# Patient Record
Sex: FEMALE | Race: WHITE | NOT HISPANIC OR LATINO | URBAN - METROPOLITAN AREA
[De-identification: names, ages, dates, MRNs, and addresses within clinical notes are randomized per-mention and may not be internally consistent; named-entity substitution may affect disease eponyms.]

---

## 2020-01-11 ENCOUNTER — INPATIENT (INPATIENT)
Facility: HOSPITAL | Age: 40
LOS: 18 days | Discharge: NURSING, REHAB OR LONG-TERM CARE | End: 2020-01-30
Admitting: INTERNAL MEDICINE
Payer: COMMERCIAL

## 2020-01-11 DIAGNOSIS — F17.210 NICOTINE DEPENDENCE, CIGARETTES, UNCOMPLICATED: ICD-10-CM

## 2020-01-11 DIAGNOSIS — I46.2 CARDIAC ARREST DUE TO UNDERLYING CARDIAC CONDITION: ICD-10-CM

## 2020-01-11 DIAGNOSIS — I69.891 DYSPHAGIA FOLLOWING OTHER CEREBROVASCULAR DISEASE: ICD-10-CM

## 2020-01-11 DIAGNOSIS — I25.10 ATHEROSCLEROTIC HEART DISEASE OF NATIVE CORONARY ARTERY WITHOUT ANGINA PECTORIS: ICD-10-CM

## 2020-01-11 DIAGNOSIS — I44.2 ATRIOVENTRICULAR BLOCK, COMPLETE: ICD-10-CM

## 2020-01-11 DIAGNOSIS — D72.829 ELEVATED WHITE BLOOD CELL COUNT, UNSPECIFIED: ICD-10-CM

## 2020-01-11 DIAGNOSIS — R55 SYNCOPE AND COLLAPSE: ICD-10-CM

## 2020-01-11 DIAGNOSIS — E87.2 ACIDOSIS: ICD-10-CM

## 2020-01-11 DIAGNOSIS — G93.1 ANOXIC BRAIN DAMAGE, NOT ELSEWHERE CLASSIFIED: ICD-10-CM

## 2020-01-11 DIAGNOSIS — I21.19 ST ELEVATION (STEMI) MYOCARDIAL INFARCTION INVOLVING OTHER CORONARY ARTERY OF INFERIOR WALL: ICD-10-CM

## 2020-01-11 DIAGNOSIS — I49.01 VENTRICULAR FIBRILLATION: ICD-10-CM

## 2020-01-11 DIAGNOSIS — I69.814 FRONTAL LOBE AND EXECUTIVE FUNCTION DEFICIT FOLLOWING OTHER CEREBROVASCULAR DISEASE: ICD-10-CM

## 2020-01-11 DIAGNOSIS — R13.12 DYSPHAGIA, OROPHARYNGEAL PHASE: ICD-10-CM

## 2020-01-11 DIAGNOSIS — J96.00 ACUTE RESPIRATORY FAILURE, UNSPECIFIED WHETHER WITH HYPOXIA OR HYPERCAPNIA: ICD-10-CM

## 2020-01-11 DIAGNOSIS — I21.3 ST ELEVATION (STEMI) MYOCARDIAL INFARCTION OF UNSPECIFIED SITE: ICD-10-CM

## 2020-01-11 PROCEDURE — 71045 X-RAY EXAM CHEST 1 VIEW: CPT | Mod: 26

## 2020-01-11 PROCEDURE — 70450 CT HEAD/BRAIN W/O DYE: CPT | Mod: 26

## 2020-01-11 PROCEDURE — 92950 HEART/LUNG RESUSCITATION CPR: CPT

## 2020-01-11 PROCEDURE — 99285 EMERGENCY DEPT VISIT HI MDM: CPT | Mod: 25

## 2020-01-11 PROCEDURE — 93458 L HRT ARTERY/VENTRICLE ANGIO: CPT | Mod: 26,59

## 2020-01-11 PROCEDURE — 71045 X-RAY EXAM CHEST 1 VIEW: CPT | Mod: 26,77

## 2020-01-11 PROCEDURE — 92941 PRQ TRLML REVSC TOT OCCL AMI: CPT | Mod: RC

## 2020-01-11 PROCEDURE — 99255 IP/OBS CONSLTJ NEW/EST HI 80: CPT | Mod: 25

## 2020-01-11 PROCEDURE — 93970 EXTREMITY STUDY: CPT | Mod: 26

## 2020-01-11 PROCEDURE — 92978 ENDOLUMINL IVUS OCT C 1ST: CPT | Mod: 26,RC

## 2020-01-12 PROCEDURE — 71045 X-RAY EXAM CHEST 1 VIEW: CPT | Mod: 26

## 2020-01-12 PROCEDURE — 93306 TTE W/DOPPLER COMPLETE: CPT | Mod: 26

## 2020-01-12 PROCEDURE — 99233 SBSQ HOSP IP/OBS HIGH 50: CPT

## 2020-01-13 PROBLEM — Z00.00 ENCOUNTER FOR PREVENTIVE HEALTH EXAMINATION: Status: ACTIVE | Noted: 2020-01-13

## 2020-01-13 PROCEDURE — 99233 SBSQ HOSP IP/OBS HIGH 50: CPT

## 2020-01-13 PROCEDURE — 71045 X-RAY EXAM CHEST 1 VIEW: CPT | Mod: 26

## 2020-01-14 PROCEDURE — 70450 CT HEAD/BRAIN W/O DYE: CPT | Mod: 26

## 2020-01-14 PROCEDURE — 99233 SBSQ HOSP IP/OBS HIGH 50: CPT

## 2020-01-15 ENCOUNTER — OUTPATIENT (OUTPATIENT)
Dept: OUTPATIENT SERVICES | Facility: HOSPITAL | Age: 40
LOS: 1 days | End: 2020-01-15

## 2020-01-15 PROCEDURE — 71045 X-RAY EXAM CHEST 1 VIEW: CPT | Mod: 26

## 2020-01-16 ENCOUNTER — OUTPATIENT (OUTPATIENT)
Dept: OUTPATIENT SERVICES | Facility: HOSPITAL | Age: 40
LOS: 1 days | End: 2020-01-16

## 2020-01-16 PROCEDURE — 99233 SBSQ HOSP IP/OBS HIGH 50: CPT

## 2020-01-16 PROCEDURE — 71045 X-RAY EXAM CHEST 1 VIEW: CPT | Mod: 26

## 2020-01-17 ENCOUNTER — OUTPATIENT (OUTPATIENT)
Dept: OUTPATIENT SERVICES | Facility: HOSPITAL | Age: 40
LOS: 1 days | End: 2020-01-17

## 2020-01-18 PROCEDURE — 99232 SBSQ HOSP IP/OBS MODERATE 35: CPT

## 2020-01-19 PROCEDURE — 99232 SBSQ HOSP IP/OBS MODERATE 35: CPT

## 2020-01-20 PROCEDURE — 99232 SBSQ HOSP IP/OBS MODERATE 35: CPT

## 2020-01-20 PROCEDURE — 70551 MRI BRAIN STEM W/O DYE: CPT | Mod: 26

## 2020-01-21 PROCEDURE — 71045 X-RAY EXAM CHEST 1 VIEW: CPT | Mod: 26

## 2020-01-23 ENCOUNTER — OUTPATIENT (OUTPATIENT)
Dept: OUTPATIENT SERVICES | Facility: HOSPITAL | Age: 40
LOS: 1 days | End: 2020-01-23

## 2020-01-24 ENCOUNTER — OUTPATIENT (OUTPATIENT)
Dept: OUTPATIENT SERVICES | Facility: HOSPITAL | Age: 40
LOS: 1 days | End: 2020-01-24

## 2020-01-25 ENCOUNTER — OUTPATIENT (OUTPATIENT)
Dept: OUTPATIENT SERVICES | Facility: HOSPITAL | Age: 40
LOS: 1 days | End: 2020-01-25

## 2020-01-26 ENCOUNTER — OUTPATIENT (OUTPATIENT)
Dept: OUTPATIENT SERVICES | Facility: HOSPITAL | Age: 40
LOS: 1 days | End: 2020-01-26

## 2020-01-27 ENCOUNTER — OUTPATIENT (OUTPATIENT)
Dept: OUTPATIENT SERVICES | Facility: HOSPITAL | Age: 40
LOS: 1 days | End: 2020-01-27

## 2020-01-29 ENCOUNTER — OUTPATIENT (OUTPATIENT)
Dept: OUTPATIENT SERVICES | Facility: HOSPITAL | Age: 40
LOS: 1 days | End: 2020-01-29

## 2020-03-17 ENCOUNTER — APPOINTMENT (OUTPATIENT)
Dept: CARDIOLOGY | Facility: CLINIC | Age: 40
End: 2020-03-17
Payer: COMMERCIAL

## 2020-03-17 VITALS
HEIGHT: 65 IN | WEIGHT: 126 LBS | HEART RATE: 63 BPM | OXYGEN SATURATION: 95 % | BODY MASS INDEX: 20.99 KG/M2 | DIASTOLIC BLOOD PRESSURE: 60 MMHG | SYSTOLIC BLOOD PRESSURE: 110 MMHG

## 2020-03-17 DIAGNOSIS — Z78.9 OTHER SPECIFIED HEALTH STATUS: ICD-10-CM

## 2020-03-17 DIAGNOSIS — Z86.74 PERSONAL HISTORY OF SUDDEN CARDIAC ARREST: ICD-10-CM

## 2020-03-17 DIAGNOSIS — I25.2 OLD MYOCARDIAL INFARCTION: ICD-10-CM

## 2020-03-17 DIAGNOSIS — Z87.891 PERSONAL HISTORY OF NICOTINE DEPENDENCE: ICD-10-CM

## 2020-03-17 DIAGNOSIS — Z86.69 PERSONAL HISTORY OF OTHER DISEASES OF THE NERVOUS SYSTEM AND SENSE ORGANS: ICD-10-CM

## 2020-03-17 PROCEDURE — 99214 OFFICE O/P EST MOD 30 MIN: CPT

## 2020-03-17 RX ORDER — ASPIRIN 81 MG
81 TABLET, DELAYED RELEASE (ENTERIC COATED) ORAL DAILY
Refills: 0 | Status: ACTIVE | COMMUNITY

## 2020-03-17 RX ORDER — METOPROLOL TARTRATE 50 MG/1
50 TABLET, FILM COATED ORAL
Refills: 0 | Status: DISCONTINUED | COMMUNITY
End: 2020-03-17

## 2020-03-17 NOTE — DISCUSSION/SUMMARY
[FreeTextEntry1] : 1. Coronary Artery Disease: s/p inferior wall MI (STEMI) on January 11, 2020. Suffered VF arrest. Taken emergently to cath lab and underwent PCI with 1 BRADFORD to the proximal RCA. Normal coronary arteries elsewhere. Normal LV systolic function on follow up echocardiogram. Patient currently on Aspirin 81mg daily, Brilinta 90mg BID, Metoprolol 25mg BID (high risk medication with no signs of toxicity), enalapril 10mg daily and atorvastatin 80mg daily. Discussed importance of dual antiplatelet therapy for 1 year. After 1 year Brilinta can be discontinued and she continues on Aspirin 81mg daily lifelong as long as the benefits continue to the outweigh the risks. Her HRs have been running in the 40-high 50s. Recommend stopping Metoprolol tartrate 25mg BID and start Metoprolol succinate 25mg daily. Once COVID-19 pandemic has improved can consider cardiac rehab in the future. \par \par Gave slip for labs. Goal LDL is going to be less than 70 going forward. Patient continues to not smoke\par \par Follow up in 4 months.

## 2020-03-17 NOTE — REASON FOR VISIT
[Initial Evaluation] : an initial evaluation of [Coronary Artery Disease] : coronary artery disease [Family Member] : family member [FreeTextEntry1] : s/p cardiac arrest secondary to AMI. anoxic brain injury

## 2020-03-17 NOTE — REVIEW OF SYSTEMS
[Feeling Fatigued] : feeling fatigued [Blurry Vision] : blurred vision [see HPI] : see HPI [Negative] : Heme/Lymph [Shortness Of Breath] : no shortness of breath [Dyspnea on exertion] : not dyspnea during exertion [Chest Pain] : no chest pain [Lower Ext Edema] : no extremity edema [Palpitations] : no palpitations [Cough] : no cough [Wheezing] : no wheezing

## 2020-03-17 NOTE — PHYSICAL EXAM
[General Appearance - Well Developed] : well developed [Normal Appearance] : normal appearance [Well Groomed] : well groomed [General Appearance - Well Nourished] : well nourished [General Appearance - In No Acute Distress] : no acute distress [Normal Conjunctiva] : the conjunctiva exhibited no abnormalities [Eyelids - No Xanthelasma] : the eyelids demonstrated no xanthelasmas [Normal Oral Mucosa] : normal oral mucosa [No Oral Pallor] : no oral pallor [No Oral Cyanosis] : no oral cyanosis [Heart Rate And Rhythm] : heart rate and rhythm were normal [Heart Sounds] : normal S1 and S2 [Murmurs] : no murmurs present [Edema] : no peripheral edema present [Respiration, Rhythm And Depth] : normal respiratory rhythm and effort [Exaggerated Use Of Accessory Muscles For Inspiration] : no accessory muscle use [Auscultation Breath Sounds / Voice Sounds] : lungs were clear to auscultation bilaterally [Nail Clubbing] : no clubbing of the fingernails [Cyanosis, Localized] : no localized cyanosis [Skin Color & Pigmentation] : normal skin color and pigmentation [Skin Turgor] : normal skin turgor [] : no rash [FreeTextEntry1] : flat affect, mild cognitive impairment.

## 2020-03-17 NOTE — HISTORY OF PRESENT ILLNESS
[FreeTextEntry1] : 40 year old female, former smoker, [place] [facility] [place]. On arrival to ED patient was found to be in cardiac arrest (VF) with an inferior STEMI. Patient was taken emergently to the cardiac catheterization lab and underwent PCI with 1 BRADFORD to the proximal RCA. At first patient did not regain meaningful neurological recovery and underwent targeted temperature control. Patient was extubated on January 16, 2020. 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 She underwent EGD with PEG placement for her nutritional requirements. For several days patient was non communicative, not following commands. However, over time patient started showing improvement with her speech and cognitive abilities. She was eventually discharged to rehab. She was discharged from rehab on February 27, 2020, and the PEG was removed as well. Patient taking all her medications with complaints. She still has some cognitive impairment and visual disturbances as sequelae of her anoxic brain injury. She denies any chest pain or SOB.

## 2020-08-18 ENCOUNTER — APPOINTMENT (OUTPATIENT)
Dept: CARDIOLOGY | Facility: CLINIC | Age: 40
End: 2020-08-18
Payer: MEDICAID

## 2020-08-18 ENCOUNTER — NON-APPOINTMENT (OUTPATIENT)
Age: 40
End: 2020-08-18

## 2020-08-18 VITALS
OXYGEN SATURATION: 99 % | HEIGHT: 65 IN | HEART RATE: 50 BPM | SYSTOLIC BLOOD PRESSURE: 116 MMHG | WEIGHT: 129 LBS | DIASTOLIC BLOOD PRESSURE: 60 MMHG | BODY MASS INDEX: 21.49 KG/M2 | TEMPERATURE: 98 F

## 2020-08-18 PROCEDURE — 99215 OFFICE O/P EST HI 40 MIN: CPT | Mod: 25

## 2020-08-18 PROCEDURE — 93000 ELECTROCARDIOGRAM COMPLETE: CPT

## 2020-08-18 RX ORDER — DOCUSATE SODIUM 100 MG/1
CAPSULE ORAL
Refills: 0 | Status: ACTIVE | COMMUNITY

## 2020-08-18 RX ORDER — FERROUS SULFATE TAB EC 325 MG (65 MG FE EQUIVALENT) 325 (65 FE) MG
325 (65 FE) TABLET DELAYED RESPONSE ORAL
Refills: 0 | Status: ACTIVE | COMMUNITY

## 2020-08-18 NOTE — PHYSICAL EXAM
[General Appearance - Well Developed] : well developed [Well Groomed] : well groomed [Normal Appearance] : normal appearance [General Appearance - In No Acute Distress] : no acute distress [General Appearance - Well Nourished] : well nourished [Eyelids - No Xanthelasma] : the eyelids demonstrated no xanthelasmas [Normal Conjunctiva] : the conjunctiva exhibited no abnormalities [Normal Oral Mucosa] : normal oral mucosa [No Oral Pallor] : no oral pallor [No Oral Cyanosis] : no oral cyanosis [Respiration, Rhythm And Depth] : normal respiratory rhythm and effort [Exaggerated Use Of Accessory Muscles For Inspiration] : no accessory muscle use [Auscultation Breath Sounds / Voice Sounds] : lungs were clear to auscultation bilaterally [Murmurs] : no murmurs present [Heart Sounds] : normal S1 and S2 [Edema] : no peripheral edema present [Cyanosis, Localized] : no localized cyanosis [Nail Clubbing] : no clubbing of the fingernails [Skin Color & Pigmentation] : normal skin color and pigmentation [Skin Turgor] : normal skin turgor [FreeTextEntry1] : flat affect, mild cognitive impairment.  [] : no rash

## 2020-08-18 NOTE — REASON FOR VISIT
[Coronary Artery Disease] : coronary artery disease [Initial Evaluation] : an initial evaluation of [FreeTextEntry1] : s/p cardiac arrest secondary to AMI. anoxic brain injury  [Family Member] : family member

## 2020-08-18 NOTE — REVIEW OF SYSTEMS
[Feeling Fatigued] : feeling fatigued [Shortness Of Breath] : no shortness of breath [Blurry Vision] : blurred vision [Dyspnea on exertion] : not dyspnea during exertion [Chest Pain] : no chest pain [Cough] : no cough [Lower Ext Edema] : no extremity edema [Palpitations] : no palpitations [Wheezing] : no wheezing [see HPI] : see HPI [Negative] : Heme/Lymph

## 2020-08-18 NOTE — DISCUSSION/SUMMARY
[FreeTextEntry1] : 1. Coronary Artery Disease: s/p inferior wall MI (STEMI) on January 11, 2020. Suffered VF arrest. Taken emergently to cath lab and underwent PCI with 1 BRADFORD to the proximal RCA. Normal coronary arteries elsewhere. Normal LV systolic function on follow up echocardiogram. Patient currently on Aspirin 81mg daily, Brilinta 90mg BID, Metoprolol succinate 25mg daily (high risk medication with no signs of toxicity), enalapril 10mg daily and atorvastatin 80mg daily. Discussed importance of dual antiplatelet therapy for 1 year. After 1 year Brilinta can be discontinued and she continues on Aspirin 81mg daily lifelong as long as the benefits continue to the outweigh the risks. Goal LDL is going to be less than 70 going forward. Patient continues to not smoke.\par \par Patient offered a referral for cardiac rehab but states she is already doing physical therapy at [facility]. \par \par Follow up in 6 months.

## 2020-08-18 NOTE — HISTORY OF PRESENT ILLNESS
[FreeTextEntry1] : Historical Perspective:\par 40 year old female, former smoker, [place] [facility] [place]. On arrival to ED patient was found to be in cardiac arrest (VF) with an inferior STEMI. Patient was taken emergently to the cardiac catheterization lab and underwent PCI with 1 BRADFORD to the proximal RCA. At first patient did not regain meaningful neurological recovery and underwent targeted temperature control. Patient was extubated on January 16, 2020. 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 She underwent EGD with PEG placement for her nutritional requirements. For several days patient was non communicative, not following commands. However, over time patient started showing improvement with her speech and cognitive abilities. She was eventually discharged to rehab. She was discharged from rehab on February 27, 2020, and the PEG was removed as well. Patient taking all her medications with complaints. She still has some cognitive impairment and visual disturbances as sequelae of her anoxic brain injury. She denies any chest pain or SOB. \par \par Current Ariel Status:\par Patient with no chest pain, SOB, or palpitations. No hospitalizations since seeing me last. Remains compliant with his medications and reports no adverse effects.\par

## 2020-12-04 ENCOUNTER — NON-APPOINTMENT (OUTPATIENT)
Age: 40
End: 2020-12-04

## 2021-02-03 ENCOUNTER — APPOINTMENT (OUTPATIENT)
Dept: DISASTER EMERGENCY | Facility: CLINIC | Age: 41
End: 2021-02-03

## 2021-02-04 LAB — SARS-COV-2 N GENE NPH QL NAA+PROBE: NOT DETECTED

## 2021-02-06 ENCOUNTER — OUTPATIENT (OUTPATIENT)
Dept: OUTPATIENT SERVICES | Facility: HOSPITAL | Age: 41
LOS: 1 days | End: 2021-02-06

## 2021-02-12 ENCOUNTER — RX CHANGE (OUTPATIENT)
Age: 41
End: 2021-02-12

## 2021-02-26 ENCOUNTER — APPOINTMENT (OUTPATIENT)
Dept: CARDIOLOGY | Facility: CLINIC | Age: 41
End: 2021-02-26
Payer: MEDICAID

## 2021-02-26 ENCOUNTER — NON-APPOINTMENT (OUTPATIENT)
Age: 41
End: 2021-02-26

## 2021-02-26 VITALS
SYSTOLIC BLOOD PRESSURE: 116 MMHG | OXYGEN SATURATION: 95 % | HEIGHT: 66 IN | WEIGHT: 123 LBS | TEMPERATURE: 97.6 F | DIASTOLIC BLOOD PRESSURE: 60 MMHG | BODY MASS INDEX: 19.77 KG/M2 | HEART RATE: 51 BPM

## 2021-02-26 DIAGNOSIS — Z01.818 ENCOUNTER FOR OTHER PREPROCEDURAL EXAMINATION: ICD-10-CM

## 2021-02-26 PROCEDURE — 93000 ELECTROCARDIOGRAM COMPLETE: CPT

## 2021-02-26 PROCEDURE — 99072 ADDL SUPL MATRL&STAF TM PHE: CPT

## 2021-02-26 PROCEDURE — 99215 OFFICE O/P EST HI 40 MIN: CPT | Mod: 25

## 2021-02-26 RX ORDER — TICAGRELOR 90 MG/1
90 TABLET ORAL TWICE DAILY
Qty: 180 | Refills: 3 | Status: DISCONTINUED | COMMUNITY
Start: 1900-01-01 | End: 2021-02-26

## 2021-02-26 NOTE — PHYSICAL EXAM
[General Appearance - Well Developed] : well developed [Normal Appearance] : normal appearance [Well Groomed] : well groomed [General Appearance - Well Nourished] : well nourished [General Appearance - In No Acute Distress] : no acute distress [Normal Conjunctiva] : the conjunctiva exhibited no abnormalities [Eyelids - No Xanthelasma] : the eyelids demonstrated no xanthelasmas [Normal Oral Mucosa] : normal oral mucosa [No Oral Pallor] : no oral pallor [No Oral Cyanosis] : no oral cyanosis [Respiration, Rhythm And Depth] : normal respiratory rhythm and effort [Exaggerated Use Of Accessory Muscles For Inspiration] : no accessory muscle use [Auscultation Breath Sounds / Voice Sounds] : lungs were clear to auscultation bilaterally [Heart Sounds] : normal S1 and S2 [Murmurs] : no murmurs present [Edema] : no peripheral edema present [Nail Clubbing] : no clubbing of the fingernails [Cyanosis, Localized] : no localized cyanosis [Skin Color & Pigmentation] : normal skin color and pigmentation [Skin Turgor] : normal skin turgor [] : no rash [FreeTextEntry1] : flat affect, mild cognitive impairment.

## 2021-02-26 NOTE — REVIEW OF SYSTEMS
[Feeling Fatigued] : feeling fatigued [Blurry Vision] : blurred vision [Shortness Of Breath] : no shortness of breath [Dyspnea on exertion] : not dyspnea during exertion [Chest Pain] : no chest pain [Lower Ext Edema] : no extremity edema [Palpitations] : no palpitations [Cough] : no cough [Wheezing] : no wheezing [see HPI] : see HPI [Negative] : Heme/Lymph

## 2021-02-26 NOTE — DISCUSSION/SUMMARY
[FreeTextEntry1] : 1. Coronary Artery Disease: s/p inferior wall MI (STEMI) on January 11, 2020. Suffered VF arrest. Taken emergently to cath lab and underwent PCI with 1 BRADFORD to the proximal RCA. Normal coronary arteries elsewhere. Normal LV systolic function on follow up echocardiogram. Patient currently on Aspirin 81mg daily, Brilinta 90mg BID, Metoprolol succinate 25mg daily (high risk medication with no signs of toxicity), enalapril 10mg daily and atorvastatin 80mg daily. It has been >1 year since MI. Recommend d/c Brilinta at this time. Given the fatigue, low BPs and sinus bradycardia, recommend lowering dose of Toprol XL to 12.5mg daily. Recommend echocardiogram.\par  \par \par Follow up in 6 months.

## 2021-02-26 NOTE — HISTORY OF PRESENT ILLNESS
[FreeTextEntry1] : Historical Perspective:\par 41 year old female, former smoker, was [place] [facility] [place]. On arrival to ED patient was found to be in cardiac arrest (VF) with an inferior STEMI. Patient was taken emergently to the cardiac catheterization lab and underwent PCI with 1 BRADFORD to the proximal RCA. At first patient did not regain meaningful neurological recovery and underwent targeted temperature control. Patient was extubated on January 16, 2020. 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 She underwent EGD with PEG placement for her nutritional requirements. For several days patient was non communicative, not following commands. However, over time patient started showing improvement with her speech and cognitive abilities. She was eventually discharged to rehab. She was discharged from rehab on February 27, 2020, and the PEG was removed as well. Patient taking all her medications with complaints. She still has some cognitive impairment and visual disturbances as sequelae of her anoxic brain injury. She denies any chest pain or SOB. \par \par Current Ariel Status:\par Patient with no chest pain, SOB, or palpitations. No hospitalizations since seeing me last. Remains compliant with her medications. Patient feeling fatigued. Sometime lightheaded. She states when she goes to hematologist BP can be in the 90s systolic. No syncope.

## 2021-03-15 ENCOUNTER — APPOINTMENT (OUTPATIENT)
Dept: CARDIOLOGY | Facility: CLINIC | Age: 41
End: 2021-03-15
Payer: MEDICAID

## 2021-03-15 PROCEDURE — 93306 TTE W/DOPPLER COMPLETE: CPT

## 2021-03-15 PROCEDURE — 99072 ADDL SUPL MATRL&STAF TM PHE: CPT

## 2021-05-20 ENCOUNTER — RX RENEWAL (OUTPATIENT)
Age: 41
End: 2021-05-20

## 2021-08-16 ENCOUNTER — APPOINTMENT (OUTPATIENT)
Dept: CARDIOLOGY | Facility: CLINIC | Age: 41
End: 2021-08-16

## 2021-08-24 ENCOUNTER — RX RENEWAL (OUTPATIENT)
Age: 41
End: 2021-08-24

## 2021-10-01 ENCOUNTER — APPOINTMENT (OUTPATIENT)
Dept: CARDIOLOGY | Facility: CLINIC | Age: 41
End: 2021-10-01
Payer: MEDICAID

## 2021-10-01 ENCOUNTER — NON-APPOINTMENT (OUTPATIENT)
Age: 41
End: 2021-10-01

## 2021-10-01 VITALS
BODY MASS INDEX: 21.38 KG/M2 | OXYGEN SATURATION: 98 % | DIASTOLIC BLOOD PRESSURE: 70 MMHG | WEIGHT: 133 LBS | SYSTOLIC BLOOD PRESSURE: 112 MMHG | HEIGHT: 66 IN | HEART RATE: 67 BPM | TEMPERATURE: 97.1 F

## 2021-10-01 PROCEDURE — 99215 OFFICE O/P EST HI 40 MIN: CPT | Mod: 25

## 2021-10-01 PROCEDURE — 93000 ELECTROCARDIOGRAM COMPLETE: CPT

## 2021-10-01 RX ORDER — AMANTADINE HYDROCHLORIDE 100 MG/1
100 CAPSULE ORAL
Refills: 0 | Status: DISCONTINUED | COMMUNITY
End: 2021-10-01

## 2021-10-01 NOTE — PHYSICAL EXAM
[Normal] : moves all extremities, no focal deficits, normal speech [de-identified] : No JVD, no carotid artery bruits auscultated bilaterally

## 2021-10-01 NOTE — CARDIOLOGY SUMMARY
[de-identified] : 10/1/2021, NSR, low voltages, poor R wave progression [de-identified] : 3/15/2021, LV EF 60%, mild MR, normal LV diastolic function, trace TR. [de-identified] : 01/11/2020, 80% stenosis with haziness of the proximal RCA. s/p PCI with BRADFORD x 1. Other arteries were normal.

## 2021-10-01 NOTE — HISTORY OF PRESENT ILLNESS
[FreeTextEntry1] : Historical Perspective:\par 41 year old female, former smoker, was brought [place] [facility] [place] being found unresponsive. On arrival to ED patient was found to be in cardiac arrest (VF) with an inferior STEMI. Patient was taken emergently to the cardiac catheterization lab and underwent PCI with 1 BRADFORD to the proximal RCA. At first patient did not regain meaningful neurological recovery and underwent targeted temperature control. Patient was extubated on January 16, 2020. 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 She underwent EGD with PEG placement for her nutritional requirements. For several days patient was non communicative, not following commands. However, over time patient started showing improvement with her speech and cognitive abilities. She was eventually discharged to rehab. She was discharged from rehab on February 27, 2020, and the PEG was removed as well. Patient taking all her medications with complaints. She still has some cognitive impairment and visual disturbances as sequelae of her anoxic brain injury. She denies any chest pain or SOB. \par \par Current Ariel Status:\par Patient with no chest pain, SOB, or palpitations. No hospitalizations since seeing me last. Remains compliant with her medications. Patient states she is getting muscle aches in her legs on the atorvastatin 80mg daily.

## 2021-10-01 NOTE — DISCUSSION/SUMMARY
[FreeTextEntry1] : 1. Coronary Artery Disease: s/p inferior wall MI (STEMI) on January 11, 2020. Suffered VF arrest. Taken emergently to cath lab and underwent PCI with 1 BRADFORD to the proximal RCA. Normal coronary arteries elsewhere. Normal LV systolic function on follow up echocardiogram. Patient currently on Aspirin 81mg daily, Metoprolol succinate 12.5mg daily (high risk medication with no signs of toxicity), enalapril 10mg daily and atorvastatin 80mg daily. Patient having myalgias on atorvastatin 80mg daily. In January 2021, LDL was 30. Will decrease dose to 40mg daily and repeat lipid panel in 3 months.\par \par 2. HLD: see plan above.\par  \par \par Follow up in 6 months.

## 2021-12-03 ENCOUNTER — TRANSCRIPTION ENCOUNTER (OUTPATIENT)
Age: 41
End: 2021-12-03

## 2022-01-19 ENCOUNTER — RX RENEWAL (OUTPATIENT)
Age: 42
End: 2022-01-19

## 2022-02-14 ENCOUNTER — RX CHANGE (OUTPATIENT)
Age: 42
End: 2022-02-14

## 2022-02-16 ENCOUNTER — RX CHANGE (OUTPATIENT)
Age: 42
End: 2022-02-16

## 2022-04-05 ENCOUNTER — APPOINTMENT (OUTPATIENT)
Dept: CARDIOLOGY | Facility: CLINIC | Age: 42
End: 2022-04-05
Payer: MEDICAID

## 2022-04-05 VITALS
HEIGHT: 66 IN | TEMPERATURE: 97.1 F | BODY MASS INDEX: 25.07 KG/M2 | SYSTOLIC BLOOD PRESSURE: 100 MMHG | OXYGEN SATURATION: 99 % | WEIGHT: 156 LBS | HEART RATE: 62 BPM | DIASTOLIC BLOOD PRESSURE: 60 MMHG

## 2022-04-05 PROCEDURE — 99215 OFFICE O/P EST HI 40 MIN: CPT

## 2022-04-05 NOTE — PHYSICAL EXAM
[Normal] : moves all extremities, no focal deficits, normal speech [de-identified] : No carotid artery bruits auscultated bilaterally

## 2022-04-05 NOTE — CARDIOLOGY SUMMARY
[de-identified] : 10/1/2021, NSR, low voltages, poor R wave progression [de-identified] : 3/15/2021, LV EF 60%, mild MR, normal LV diastolic function, trace TR. [de-identified] : 01/11/2020, 80% stenosis with haziness of the proximal RCA. s/p PCI with BRADFORD x 1. Other arteries were normal.

## 2022-04-05 NOTE — HISTORY OF PRESENT ILLNESS
[FreeTextEntry1] : Historical Perspective:\par 41 year old female, former smoker, was brought to [facility] [place] after being found unresponsive. On arrival to ED patient was found to be in cardiac arrest (VF) with an inferior STEMI. Patient was taken emergently to the cardiac catheterization lab and underwent PCI with 1 BRADFORD to the proximal RCA. At first patient did not regain meaningful neurological recovery and underwent targeted temperature control. Patient was extubated on January 16, 2020. 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 She underwent EGD with PEG placement for her nutritional requirements. For several days patient was non communicative, not following commands. However, over time patient started showing improvement with her speech and cognitive abilities. She was eventually discharged to rehab. She was discharged from rehab on February 27, 2020, and the PEG was removed as well. Patient taking all her medications with complaints. She still has some cognitive impairment and visual disturbances as sequelae of her anoxic brain injury. She denies any chest pain or SOB. \par \par Current Ariel Status:\par Patient with no chest pain, SOB, or palpitations. No hospitalizations since seeing me last. Remains compliant with her medications. Mother states BP is on low side most of the time, low 100s-high 90mmHg SBP. Patient asymptomatic in this regard

## 2022-04-05 NOTE — DISCUSSION/SUMMARY
[FreeTextEntry1] : 1. Coronary Artery Disease: s/p inferior wall MI (STEMI) on January 11, 2020. Suffered VF arrest. Taken emergently to cath lab and underwent PCI with 1 BRADFORD to the proximal RCA. Normal coronary arteries elsewhere. Normal LV systolic function on follow up echocardiogram. Patient currently on Aspirin 81mg daily, Metoprolol succinate 12.5mg daily (high risk medication with no signs of toxicity), enalapril 10mg daily and atorvastatin 80mg daily. Patient was having myalgias on atorvastatin 80mg daily. In January 2021, LDL was 30. Decreased dose to 40mg daily and repeat lipid panel from February 2022, demonstrated LDL of 59. . Mother states BP is on low side most of the time, low 100s-high 90mmHg SBP. Patient asymptomatic in this regard. Recommend decreasing enalapril to 5mg daily.\par \par 2. HLD: see plan above.\par \par 3. Mild Mitral Regurgitation: periodic echo surveillance.\par  \par Follow up in 6 months.

## 2022-09-09 ENCOUNTER — RX CHANGE (OUTPATIENT)
Age: 42
End: 2022-09-09

## 2022-10-13 ENCOUNTER — NON-APPOINTMENT (OUTPATIENT)
Age: 42
End: 2022-10-13

## 2022-10-13 ENCOUNTER — APPOINTMENT (OUTPATIENT)
Dept: CARDIOLOGY | Facility: CLINIC | Age: 42
End: 2022-10-13

## 2022-10-13 VITALS
DIASTOLIC BLOOD PRESSURE: 62 MMHG | HEART RATE: 51 BPM | WEIGHT: 150 LBS | SYSTOLIC BLOOD PRESSURE: 122 MMHG | OXYGEN SATURATION: 97 % | BODY MASS INDEX: 24.11 KG/M2 | HEIGHT: 66 IN

## 2022-10-13 PROCEDURE — 99215 OFFICE O/P EST HI 40 MIN: CPT | Mod: 25

## 2022-10-13 PROCEDURE — 93000 ELECTROCARDIOGRAM COMPLETE: CPT

## 2022-10-13 NOTE — CARDIOLOGY SUMMARY
[de-identified] : 10/13/2022, Sinus Bradycardia, normal ECG otherwise. [de-identified] : 3/15/2021, LV EF 60%, mild MR, normal LV diastolic function, trace TR. [de-identified] : 01/11/2020, 80% stenosis with haziness of the proximal RCA. s/p PCI with BRADFORD x 1. Other arteries were normal.

## 2022-10-13 NOTE — HISTORY OF PRESENT ILLNESS
[FreeTextEntry1] : Historical Perspective:\par 42 year old female, former smoker, was brought [place] [facility] [place] being found unresponsive. On arrival to ED patient was found to be in cardiac arrest (VF) with an inferior STEMI. Patient was taken emergently to the cardiac catheterization lab and underwent PCI with 1 BRADFORD to the proximal RCA. At first patient did not regain meaningful neurological recovery and underwent targeted temperature control. Patient was extubated on January 16, 2020. 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 She underwent EGD with PEG placement for her nutritional requirements. For several days patient was non communicative, not following commands. However, over time patient started showing improvement with her speech and cognitive abilities. She was eventually discharged to rehab. She was discharged from rehab on February 27, 2020, and the PEG was removed as well. Patient taking all her medications with complaints. She still has some cognitive impairment and visual disturbances as sequelae of her anoxic brain injury. She denies any chest pain or SOB. \par \par Current Ariel Status:\par Since seeing me last, patient has started to run. She states she has been running almost daily for about 1 month. She does note by the time she runs down the block she feels she can't get a deep breath in anymore. She does note that this has been occurring with further and further distances, however. No chest pain or pressure.

## 2022-10-13 NOTE — DISCUSSION/SUMMARY
[FreeTextEntry1] : 1. Coronary Artery Disease: s/p inferior wall MI (STEMI) on January 11, 2020. Suffered VF arrest. Taken emergently to cath lab and underwent PCI with 1 BRADFORD to the proximal RCA. Normal coronary arteries elsewhere. Normal LV systolic function on follow up echocardiogram. Patient currently on Aspirin 81mg daily, Metoprolol succinate 12.5mg daily (high risk medication with no signs of toxicity), enalapril 10mg daily and atorvastatin 80mg daily. Patient was having myalgias on atorvastatin 80mg daily. In January 2021, LDL was 30. Decreased dose to 40mg daily and repeat lipid panel from February 2022, demonstrated LDL of 59. . Mother states BP is on low side most of the time, low 100s-high 90mmHg SBP. Patient asymptomatic in this regard. Recommended decreasing enalapril to 5mg daily. Given the dyspnea on exertion with running, recommend exercise nuclear stress testing on medications and echocardiogram. \par \par 2. HLD: see plan above.\par \par 3. Mild Mitral Regurgitation: periodic echo surveillance.\par  \par Follow up in 6 months.  [EKG obtained to assist in diagnosis and management of assessed problem(s)] : EKG obtained to assist in diagnosis and management of assessed problem(s)

## 2022-10-13 NOTE — PHYSICAL EXAM
[Normal S1, S2] : normal S1, S2 [Normal] : moves all extremities, no focal deficits, normal speech [de-identified] : No carotid artery bruits auscultated bilaterally [de-identified] : regular, bradycardia

## 2022-11-14 ENCOUNTER — APPOINTMENT (OUTPATIENT)
Dept: CARDIOLOGY | Facility: CLINIC | Age: 42
End: 2022-11-14

## 2022-11-14 PROCEDURE — 93306 TTE W/DOPPLER COMPLETE: CPT

## 2022-11-14 PROCEDURE — 93015 CV STRESS TEST SUPVJ I&R: CPT

## 2022-11-14 PROCEDURE — A9502: CPT

## 2022-11-14 PROCEDURE — 78452 HT MUSCLE IMAGE SPECT MULT: CPT

## 2023-04-26 ENCOUNTER — OFFICE (OUTPATIENT)
Dept: URBAN - METROPOLITAN AREA CLINIC 115 | Facility: CLINIC | Age: 43
Setting detail: OPHTHALMOLOGY
End: 2023-04-26
Payer: MEDICARE

## 2023-04-26 DIAGNOSIS — H47.611: ICD-10-CM

## 2023-04-26 DIAGNOSIS — H47.612: ICD-10-CM

## 2023-04-26 DIAGNOSIS — H35.033: ICD-10-CM

## 2023-04-26 DIAGNOSIS — H53.433: ICD-10-CM

## 2023-04-26 DIAGNOSIS — H25.13: ICD-10-CM

## 2023-04-26 PROCEDURE — 92083 EXTENDED VISUAL FIELD XM: CPT | Performed by: OPHTHALMOLOGY

## 2023-04-26 PROCEDURE — 92202 OPSCPY EXTND ON/MAC DRAW: CPT | Performed by: OPHTHALMOLOGY

## 2023-04-26 PROCEDURE — 92014 COMPRE OPH EXAM EST PT 1/>: CPT | Performed by: OPHTHALMOLOGY

## 2023-04-26 PROCEDURE — 92133 CPTRZD OPH DX IMG PST SGM ON: CPT | Performed by: OPHTHALMOLOGY

## 2023-04-26 ASSESSMENT — REFRACTION_MANIFEST
OU_VA: 20/25-2
OS_CYLINDER: SPH
OD_VA1: 20/30-2
OS_SPHERE: +0.25
OS_VA1: 20/30+2
OD_SPHERE: +0.50
OD_CYLINDER: SPH

## 2023-04-26 ASSESSMENT — REFRACTION_AUTOREFRACTION
OD_SPHERE: +0.50
OS_SPHERE: +0.25
OD_CYLINDER: +0.25
OD_AXIS: 022
OS_AXIS: 156
OS_CYLINDER: +0.50

## 2023-04-26 ASSESSMENT — TONOMETRY
OD_IOP_MMHG: 15
OS_IOP_MMHG: 15

## 2023-04-26 ASSESSMENT — AXIALLENGTH_DERIVED
OD_AL: 23.2613
OS_AL: 23.3087

## 2023-04-26 ASSESSMENT — REFRACTION_CURRENTRX
OD_VPRISM_DIRECTION: SV
OS_VPRISM_DIRECTION: SV
OD_OVR_VA: 20/
OS_OVR_VA: 20/

## 2023-04-26 ASSESSMENT — SPHEQUIV_DERIVED
OD_SPHEQUIV: 0.625
OS_SPHEQUIV: 0.5

## 2023-04-26 ASSESSMENT — KERATOMETRY
OS_K1POWER_DIOPTERS: 43.75
OD_AXISANGLE_DEGREES: 090
METHOD_AUTO_MANUAL: AUTO
OD_K2POWER_DIOPTERS: 43.75
OD_K1POWER_DIOPTERS: 43.75
OS_K2POWER_DIOPTERS: 43.75
OS_AXISANGLE_DEGREES: 090

## 2023-04-26 ASSESSMENT — CONFRONTATIONAL VISUAL FIELD TEST (CVF)
OD_FINDINGS: FULL
OS_FINDINGS: FULL

## 2023-04-26 ASSESSMENT — VISUAL ACUITY
OS_BCVA: 20/25-1
OD_BCVA: 20/25

## 2023-04-27 ENCOUNTER — APPOINTMENT (OUTPATIENT)
Dept: CARDIOLOGY | Facility: CLINIC | Age: 43
End: 2023-04-27
Payer: MEDICARE

## 2023-04-27 ENCOUNTER — NON-APPOINTMENT (OUTPATIENT)
Age: 43
End: 2023-04-27

## 2023-04-27 VITALS
DIASTOLIC BLOOD PRESSURE: 72 MMHG | SYSTOLIC BLOOD PRESSURE: 110 MMHG | WEIGHT: 153 LBS | BODY MASS INDEX: 24.59 KG/M2 | HEIGHT: 66 IN | HEART RATE: 46 BPM | OXYGEN SATURATION: 98 %

## 2023-04-27 PROCEDURE — 93000 ELECTROCARDIOGRAM COMPLETE: CPT

## 2023-04-27 PROCEDURE — 99215 OFFICE O/P EST HI 40 MIN: CPT

## 2023-04-27 RX ORDER — BUPROPION HYDROCHLORIDE 150 MG/1
150 TABLET, FILM COATED, EXTENDED RELEASE ORAL DAILY
Refills: 0 | Status: ACTIVE | COMMUNITY

## 2023-04-27 RX ORDER — FLUOXETINE HYDROCHLORIDE 20 MG/1
20 CAPSULE ORAL
Qty: 1 | Refills: 0 | Status: DISCONTINUED | COMMUNITY
End: 2023-04-27

## 2023-04-27 NOTE — PHYSICAL EXAM
[Normal S1, S2] : normal S1, S2 [Normal] : moves all extremities, no focal deficits, normal speech [de-identified] : No carotid artery bruits auscultated bilaterally [de-identified] : regular, bradycardia

## 2023-04-27 NOTE — HISTORY OF PRESENT ILLNESS
[FreeTextEntry1] : Historical Perspective:\par 43 year old female, former smoker, was brought to [facility] [place] after being found unresponsive. On arrival to ED patient was found to be in cardiac arrest (VF) with an inferior STEMI. Patient was taken emergently to the cardiac catheterization lab and underwent PCI with 1 BRADFORD to the proximal RCA. At first patient did not regain meaningful neurological recovery and underwent targeted temperature control. Patient was extubated on January 16, 2020. 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 She underwent EGD with PEG placement for her nutritional requirements. For several days patient was non communicative, not following commands. However, over time patient started showing improvement with her speech and cognitive abilities. She was eventually discharged to rehab. She was discharged from rehab on February 27, 2020, and the PEG was removed as well. Patient taking all her medications with complaints. She still has some cognitive impairment and visual disturbances as sequelae of her anoxic brain injury. She denies any chest pain or SOB. \par \par Current Ariel Status:\par Patient with no chest pain, SOB, or palpitations. No hospitalizations since seeing me last. Remains compliant with his medications and reports no adverse effects.\par

## 2023-04-27 NOTE — DISCUSSION/SUMMARY
[FreeTextEntry1] : 1. Coronary Artery Disease: s/p inferior wall MI (STEMI) on January 11, 2020. Suffered VF arrest. Taken emergently to cath lab and underwent PCI with 1 BRADFORD to the proximal RCA. Normal coronary arteries elsewhere. Normal LV systolic function on follow up echocardiogram. Patient currently on Aspirin 81mg daily, Metoprolol succinate 12.5mg daily (high risk medication with no signs of toxicity), enalapril 10mg daily and atorvastatin 80mg daily. Patient was having myalgias on atorvastatin 80mg daily. In January 2021, LDL was 30. Decreased dose to 40mg daily and repeat lipid panel from February 2022, demonstrated LDL of 59. . Mother states BP is on low side most of the time, low 100s-high 90mmHg SBP. Patient asymptomatic in this regard. Recommended decreasing enalapril to 5mg daily. \par \par 2. HLD: see plan above.\par \par 3. Mild Mitral Regurgitation: periodic echo surveillance.\par  \par Follow up in 12 months.  [EKG obtained to assist in diagnosis and management of assessed problem(s)] : EKG obtained to assist in diagnosis and management of assessed problem(s)

## 2023-04-27 NOTE — CARDIOLOGY SUMMARY
[de-identified] : 11/14/2022, Exercise Nuclear Stress Testing: exercised for 9 minutes and 39 seconds, ST changes to suggest ischemia. SPECT images revealed no evidence of ischemia.  [de-identified] : 4/27/2023, Sinus Bradycardia, normal ECG\par 10/13/2022, Sinus Bradycardia, normal ECG otherwise. [de-identified] : 11/14/2022, LV EF 55-60%, mild MR, mild TR with estimated PASP of 28mmHg.\par 3/15/2021, LV EF 60%, mild MR, normal LV diastolic function, trace TR. [de-identified] : 01/11/2020, 80% stenosis with haziness of the proximal RCA. s/p PCI with BRADFORD x 1. Other arteries were normal.

## 2023-09-02 ENCOUNTER — Encounter (OUTPATIENT)
Dept: URBAN - METROPOLITAN AREA CLINIC 115 | Facility: CLINIC | Age: 43
Setting detail: OPHTHALMOLOGY
End: 2023-09-02
Payer: MEDICARE

## 2023-10-26 ENCOUNTER — RX RENEWAL (OUTPATIENT)
Age: 43
End: 2023-10-26

## 2023-10-31 ENCOUNTER — TRANSCRIPTION ENCOUNTER (OUTPATIENT)
Age: 43
End: 2023-10-31

## 2023-11-14 ENCOUNTER — RX RENEWAL (OUTPATIENT)
Age: 43
End: 2023-11-14

## 2024-04-25 ENCOUNTER — APPOINTMENT (OUTPATIENT)
Dept: CARDIOLOGY | Facility: CLINIC | Age: 44
End: 2024-04-25
Payer: MEDICARE

## 2024-04-25 ENCOUNTER — NON-APPOINTMENT (OUTPATIENT)
Age: 44
End: 2024-04-25

## 2024-04-25 VITALS
HEIGHT: 66 IN | DIASTOLIC BLOOD PRESSURE: 60 MMHG | HEART RATE: 54 BPM | WEIGHT: 140 LBS | BODY MASS INDEX: 22.5 KG/M2 | SYSTOLIC BLOOD PRESSURE: 124 MMHG | OXYGEN SATURATION: 99 %

## 2024-04-25 DIAGNOSIS — Z95.5 PRESENCE OF CORONARY ANGIOPLASTY IMPLANT AND GRAFT: ICD-10-CM

## 2024-04-25 DIAGNOSIS — E78.00 PURE HYPERCHOLESTEROLEMIA, UNSPECIFIED: ICD-10-CM

## 2024-04-25 DIAGNOSIS — Z79.899 OTHER LONG TERM (CURRENT) DRUG THERAPY: ICD-10-CM

## 2024-04-25 DIAGNOSIS — I34.0 NONRHEUMATIC MITRAL (VALVE) INSUFFICIENCY: ICD-10-CM

## 2024-04-25 DIAGNOSIS — I25.10 ATHEROSCLEROTIC HEART DISEASE OF NATIVE CORONARY ARTERY W/OUT ANGINA PECTORIS: ICD-10-CM

## 2024-04-25 PROCEDURE — 93000 ELECTROCARDIOGRAM COMPLETE: CPT

## 2024-04-25 PROCEDURE — G2211 COMPLEX E/M VISIT ADD ON: CPT

## 2024-04-25 PROCEDURE — 99215 OFFICE O/P EST HI 40 MIN: CPT

## 2024-04-25 RX ORDER — ATORVASTATIN CALCIUM 40 MG/1
40 TABLET, FILM COATED ORAL
Qty: 90 | Refills: 3 | Status: ACTIVE | COMMUNITY
Start: 2021-08-24 | End: 1900-01-01

## 2024-04-25 RX ORDER — METOPROLOL SUCCINATE 25 MG/1
25 TABLET, EXTENDED RELEASE ORAL
Qty: 45 | Refills: 3 | Status: ACTIVE | COMMUNITY
Start: 2020-03-17 | End: 1900-01-01

## 2024-04-25 RX ORDER — ENALAPRIL MALEATE 5 MG/1
5 TABLET ORAL
Qty: 90 | Refills: 3 | Status: ACTIVE | COMMUNITY
Start: 2022-01-19 | End: 1900-01-01

## 2024-04-25 NOTE — PHYSICAL EXAM
[Normal S1, S2] : normal S1, S2 [Normal] : moves all extremities, no focal deficits, normal speech [de-identified] : No carotid artery bruits auscultated bilaterally [de-identified] : regular, bradycardia

## 2024-04-25 NOTE — HISTORY OF PRESENT ILLNESS
[FreeTextEntry1] : Historical Perspective: 44 year old female, former smoker, was brought to [facility] after being found unresponsive. On arrival to ED patient was found to be in cardiac arrest (VF) with an inferior STEMI. Patient was taken emergently to the cardiac catheterization lab and underwent PCI with 1 BRADFORD to the proximal RCA. At first patient did not regain meaningful neurological recovery and underwent targeted temperature control. Patient was extubated on January 16, 2020. 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 She underwent EGD with PEG placement for her nutritional requirements. For several days patient was non communicative, not following commands. However, over time patient started showing improvement with her speech and cognitive abilities. She was eventually discharged to rehab. She was discharged from rehab on February 27, 2020, and the PEG was removed as well. Patient taking all her medications with complaints. She still has some cognitive impairment and visual disturbances as sequelae of her anoxic brain injury. She denies any chest pain or SOB.   Current Ariel Status: Patient with no chest pain, SOB, or palpitations. No hospitalizations since seeing me last. Remains compliant with his medications and reports no adverse effects.

## 2024-04-25 NOTE — CARDIOLOGY SUMMARY
[de-identified] : 4/25/2024, Sinus Bradycardia 4/27/2023, Sinus Bradycardia, normal ECG 10/13/2022, Sinus Bradycardia, normal ECG otherwise. [de-identified] : 11/14/2022, Exercise Nuclear Stress Testing: exercised for 9 minutes and 39 seconds, ST changes to suggest ischemia. SPECT images revealed no evidence of ischemia.  [de-identified] : 11/14/2022, LV EF 55-60%, mild MR, mild TR with estimated PASP of 28mmHg.\par  3/15/2021, LV EF 60%, mild MR, normal LV diastolic function, trace TR. [de-identified] : 01/11/2020, 80% stenosis with haziness of the proximal RCA. s/p PCI with BRADFORD x 1. Other arteries were normal.

## 2024-05-01 ENCOUNTER — OFFICE (OUTPATIENT)
Dept: URBAN - METROPOLITAN AREA CLINIC 115 | Facility: CLINIC | Age: 44
Setting detail: OPHTHALMOLOGY
End: 2024-05-01
Payer: MEDICARE

## 2024-05-01 DIAGNOSIS — H47.612: ICD-10-CM

## 2024-05-01 DIAGNOSIS — H35.033: ICD-10-CM

## 2024-05-01 DIAGNOSIS — H25.13: ICD-10-CM

## 2024-05-01 DIAGNOSIS — H47.611: ICD-10-CM

## 2024-05-01 DIAGNOSIS — H50.15: ICD-10-CM

## 2024-05-01 DIAGNOSIS — H53.433: ICD-10-CM

## 2024-05-01 PROCEDURE — 92083 EXTENDED VISUAL FIELD XM: CPT | Performed by: OPHTHALMOLOGY

## 2024-05-01 PROCEDURE — 92014 COMPRE OPH EXAM EST PT 1/>: CPT | Performed by: OPHTHALMOLOGY

## 2024-05-01 PROCEDURE — 92133 CPTRZD OPH DX IMG PST SGM ON: CPT | Performed by: OPHTHALMOLOGY

## 2024-05-01 ASSESSMENT — CONFRONTATIONAL VISUAL FIELD TEST (CVF)
OS_FINDINGS: FULL
OD_FINDINGS: FULL

## 2024-05-18 ENCOUNTER — NON-APPOINTMENT (OUTPATIENT)
Age: 44
End: 2024-05-18

## 2024-05-21 ENCOUNTER — APPOINTMENT (OUTPATIENT)
Dept: CARDIOLOGY | Facility: CLINIC | Age: 44
End: 2024-05-21
Payer: MEDICARE

## 2024-05-21 PROCEDURE — 93306 TTE W/DOPPLER COMPLETE: CPT

## 2024-08-10 NOTE — REASON FOR VISIT
HPI   Chief Complaint   Patient presents with    Chest Pain     Chest Pain on Monday, returned today at 3 pm 10/10. Hx of MI in 2018 with one stent       Patient is a 55-year-old female with past medical history of MI who presents ED today due to recurrence of chest pain.  Patient was admitted to [facility] earlier this week awaiting a cardiac catheterization however they kept having to postpone her catheterization and so she left AMA.  She went home and proceeded to have recurrence of her chest pain.  She took 6 nitroglycerin before she came in for evaluation patient states that the nitroglycerin does improve the pain however does not take it away.  Patient does take baby aspirin daily.  Patient denies any shortness of breath.      History provided by:  Patient   used: No            Patient History   No past medical history on file.  No past surgical history on file.  No family history on file.  Social History     Tobacco Use    Smoking status: Not on file    Smokeless tobacco: Not on file   Substance Use Topics    Alcohol use: Not on file    Drug use: Not on file       Physical Exam   ED Triage Vitals [08/10/24 1616]   Temperature Heart Rate Respirations BP   36.8 °C (98.2 °F) 81 18 116/73      Pulse Ox Temp Source Heart Rate Source Patient Position   100 % Temporal Monitor Lying      BP Location FiO2 (%)     Left arm --       Physical Exam  Vitals and nursing note reviewed.   Constitutional:       General: She is not in acute distress.     Appearance: She is well-developed.   HENT:      Head: Normocephalic and atraumatic.   Eyes:      Conjunctiva/sclera: Conjunctivae normal.   Cardiovascular:      Rate and Rhythm: Normal rate and regular rhythm.      Heart sounds: No murmur heard.  Pulmonary:      Effort: Pulmonary effort is normal. No respiratory distress.      Breath sounds: Normal breath sounds.   Abdominal:      Palpations: Abdomen is soft.      Tenderness: There is no abdominal  tenderness.   Musculoskeletal:         General: No swelling.      Cervical back: Neck supple.   Skin:     General: Skin is warm and dry.      Capillary Refill: Capillary refill takes less than 2 seconds.   Neurological:      Mental Status: She is alert.   Psychiatric:         Mood and Affect: Mood normal.           ED Course & [facility]   ED Course as of 08/10/24 1839   Sat Aug 10, 2024   1626 ECG 12 Lead  EKG, my read, 1612  Normal sinus rhythm, no acute ST elevation or depression.  Ventricular rate-72 BPM [WS]   1633 CBC and Auto Differential(!)  No leukocytosis, mild anemia, no thrombocytopenia [WS]   1652 B-Type Natriuretic Peptide  Normal, no evidence of heart failure [WS]   1653 Magnesium  Normal [WS]   1653 Comprehensive Metabolic Panel(!)  Slight hyponatremia, no other electrolyte maladies, kidney injury, or liver pathology [WS]   1653 XR chest 1 view  No acute cardiopulmonary process [WS]   1653 Troponin I Series, High Sensitivity (0, 1 HR)  Not elevated, will be repeated [WS]   1747 Troponin I Series, High Sensitivity (0, 1 HR)  Repeat also normal [WS]      ED Course User Index  [WS] FRANCES Cervantes         Diagnoses as of 08/10/24 1839   Chest pain, unspecified type                 No data recorded     Rousseau Coma Scale Score: 15 (08/10/24 1631 : Raghu Mobley RN) HEART Score: 4 (08/10/24 1839 : FRANCES Cervantes)                         Medical Decision Making  Differential diagnosis: ACS, NSTEMI, arrhythmia, heart failure.  Patient's vital signs are stable.  Patient was given baby aspirin chewable for presumed ACS.  Medical decision making was discussed throughout the ED course, in summary: EKG showed normal sinus rhythm without acute ST elevation or depression.  CBC showed no leukocytosis, no thrombocytopenia, mild anemia.  BNP was normal without evidence of heart failure.  Magnesium is normal.  CMP shows slight hyponatremia, remaining electrolytes are stable, no kidney injury or liver  pathology.  Initial troponin was not elevated, repeat troponin was also normal, chest x-ray showed no acute cardiopulmonary process.  I did review patient's charting from the [facility] and they did recommend left heart catheterization given her history however she left AMA because a continued to delay her procedure.  Given her history we do also recommend admission for cardiac workup and cardiology evaluation.  Patient is amenable this plan.  I did discuss with the hospital attending who agreed to accept the patient to the floor.    Amount and/or Complexity of Data Reviewed  External Data Reviewed: labs and notes.  Labs: ordered. Decision-making details documented in ED Course.  Radiology: ordered and independent interpretation performed. Decision-making details documented in ED Course.  ECG/medicine tests: ordered and independent interpretation performed. Decision-making details documented in ED Course.    Risk  OTC drugs.  Prescription drug management.  Parenteral controlled substances.  Decision regarding hospitalization.        Procedure  Procedures            EMELIA Cervantes-CNP  08/10/24 7991     [Initial Evaluation] : an initial evaluation of [Coronary Artery Disease] : coronary artery disease [FreeTextEntry1] : s/p cardiac arrest secondary to AMI. anoxic brain injury  [Family Member] : family member

## 2025-04-29 ENCOUNTER — NON-APPOINTMENT (OUTPATIENT)
Age: 45
End: 2025-04-29

## 2025-05-01 ENCOUNTER — APPOINTMENT (OUTPATIENT)
Dept: CARDIOLOGY | Facility: CLINIC | Age: 45
End: 2025-05-01
Payer: MEDICARE

## 2025-05-01 ENCOUNTER — NON-APPOINTMENT (OUTPATIENT)
Age: 45
End: 2025-05-01

## 2025-05-01 VITALS
HEIGHT: 66 IN | OXYGEN SATURATION: 97 % | HEART RATE: 61 BPM | WEIGHT: 154 LBS | BODY MASS INDEX: 24.75 KG/M2 | SYSTOLIC BLOOD PRESSURE: 116 MMHG | DIASTOLIC BLOOD PRESSURE: 70 MMHG

## 2025-05-01 DIAGNOSIS — I25.10 ATHEROSCLEROTIC HEART DISEASE OF NATIVE CORONARY ARTERY W/OUT ANGINA PECTORIS: ICD-10-CM

## 2025-05-01 DIAGNOSIS — E78.00 PURE HYPERCHOLESTEROLEMIA, UNSPECIFIED: ICD-10-CM

## 2025-05-01 DIAGNOSIS — I34.0 NONRHEUMATIC MITRAL (VALVE) INSUFFICIENCY: ICD-10-CM

## 2025-05-01 DIAGNOSIS — Z95.5 PRESENCE OF CORONARY ANGIOPLASTY IMPLANT AND GRAFT: ICD-10-CM

## 2025-05-01 PROCEDURE — 99214 OFFICE O/P EST MOD 30 MIN: CPT

## 2025-05-01 PROCEDURE — G2211 COMPLEX E/M VISIT ADD ON: CPT

## 2025-05-01 PROCEDURE — 93000 ELECTROCARDIOGRAM COMPLETE: CPT

## 2025-05-01 RX ORDER — MV-MN/C/THEANINE/HERB NO.310 1000-200MG
POWDER IN PACKET (EA) ORAL
Refills: 0 | Status: ACTIVE | COMMUNITY

## 2025-05-14 ENCOUNTER — OFFICE (OUTPATIENT)
Dept: URBAN - METROPOLITAN AREA CLINIC 115 | Facility: CLINIC | Age: 45
Setting detail: OPHTHALMOLOGY
End: 2025-05-14
Payer: MEDICARE

## 2025-05-14 DIAGNOSIS — H50.15: ICD-10-CM

## 2025-05-14 DIAGNOSIS — H53.433: ICD-10-CM

## 2025-05-14 DIAGNOSIS — H35.033: ICD-10-CM

## 2025-05-14 DIAGNOSIS — H25.13: ICD-10-CM

## 2025-05-14 DIAGNOSIS — H47.612: ICD-10-CM

## 2025-05-14 DIAGNOSIS — H52.7: ICD-10-CM

## 2025-05-14 DIAGNOSIS — H47.611: ICD-10-CM

## 2025-05-14 PROCEDURE — 92060 SENSORIMOTOR EXAMINATION: CPT | Performed by: OPHTHALMOLOGY

## 2025-05-14 PROCEDURE — 92015 DETERMINE REFRACTIVE STATE: CPT | Performed by: OPHTHALMOLOGY

## 2025-05-14 PROCEDURE — 92083 EXTENDED VISUAL FIELD XM: CPT | Performed by: OPHTHALMOLOGY

## 2025-05-14 PROCEDURE — 92133 CPTRZD OPH DX IMG PST SGM ON: CPT | Performed by: OPHTHALMOLOGY

## 2025-05-14 PROCEDURE — 92014 COMPRE OPH EXAM EST PT 1/>: CPT | Performed by: OPHTHALMOLOGY

## 2025-05-14 ASSESSMENT — REFRACTION_CURRENTRX
OS_OVR_VA: 20/
OD_OVR_VA: 20/
OS_CYLINDER: -0.50
OS_VPRISM_DIRECTION: SV
OS_VPRISM_DIRECTION: SV
OD_AXIS: 127
OS_SPHERE: +1.00
OD_OVR_VA: 20/
OD_VPRISM_DIRECTION: SV
OD_SPHERE: +1.00
OD_VPRISM_DIRECTION: SV
OS_AXIS: 076
OS_OVR_VA: 20/
OD_CYLINDER: -0.50

## 2025-05-14 ASSESSMENT — KERATOMETRY
OS_K2POWER_DIOPTERS: 43.75
OS_AXISANGLE_DEGREES: 090
OD_AXISANGLE_DEGREES: 090
OS_K1POWER_DIOPTERS: 43.75
OD_K1POWER_DIOPTERS: 43.75
METHOD_AUTO_MANUAL: AUTO
OD_K2POWER_DIOPTERS: 43.75

## 2025-05-14 ASSESSMENT — REFRACTION_MANIFEST
OS_ADD: +1.25
OD_AXIS: 120
OS_AXIS: 070
OU_VA: 20/25-2
OS_SPHERE: PLANO
OS_VA1: 20/20
OS_CYLINDER: SPH
OD_VA1: 20/30-2
OS_SPHERE: +0.25
OD_SPHERE: PLANO
OD_ADD: +1.25
OS_CYLINDER: -0.50
OD_SPHERE: +0.50
OD_VA1: 20/20
OD_CYLINDER: SPH
OD_CYLINDER: -0.50
OS_VA1: 20/30+2

## 2025-05-14 ASSESSMENT — REFRACTION_AUTOREFRACTION
OS_CYLINDER: -0.50
OD_CYLINDER: -0.50
OD_SPHERE: +0.25
OS_SPHERE: +0.25
OS_AXIS: 070
OD_AXIS: 122

## 2025-05-14 ASSESSMENT — VISUAL ACUITY
OD_BCVA: 20/30-1
OS_BCVA: 20/40

## 2025-05-14 ASSESSMENT — CONFRONTATIONAL VISUAL FIELD TEST (CVF)
OD_FINDINGS: FULL
OS_FINDINGS: FULL

## 2025-05-14 ASSESSMENT — TONOMETRY
OD_IOP_MMHG: 16
OS_IOP_MMHG: 16